# Patient Record
Sex: FEMALE | Race: WHITE
[De-identification: names, ages, dates, MRNs, and addresses within clinical notes are randomized per-mention and may not be internally consistent; named-entity substitution may affect disease eponyms.]

---

## 2019-07-04 ENCOUNTER — HOSPITAL ENCOUNTER (EMERGENCY)
Dept: HOSPITAL 7 - FB.ED | Age: 29
Discharge: HOME | End: 2019-07-04
Payer: MEDICAID

## 2019-07-04 DIAGNOSIS — O21.0: Primary | ICD-10-CM

## 2019-07-04 DIAGNOSIS — O23.42: ICD-10-CM

## 2019-07-04 DIAGNOSIS — Z79.899: ICD-10-CM

## 2019-07-04 DIAGNOSIS — Z3A.18: ICD-10-CM

## 2019-07-04 DIAGNOSIS — F17.210: ICD-10-CM

## 2019-07-04 DIAGNOSIS — O99.332: ICD-10-CM

## 2019-07-04 PROCEDURE — 81001 URINALYSIS AUTO W/SCOPE: CPT

## 2019-07-04 PROCEDURE — 80053 COMPREHEN METABOLIC PANEL: CPT

## 2019-07-04 PROCEDURE — 36415 COLL VENOUS BLD VENIPUNCTURE: CPT

## 2019-07-04 PROCEDURE — 87086 URINE CULTURE/COLONY COUNT: CPT

## 2019-07-04 PROCEDURE — 96375 TX/PRO/DX INJ NEW DRUG ADDON: CPT

## 2019-07-04 PROCEDURE — 96361 HYDRATE IV INFUSION ADD-ON: CPT

## 2019-07-04 PROCEDURE — 85025 COMPLETE CBC W/AUTO DIFF WBC: CPT

## 2019-07-04 PROCEDURE — 83735 ASSAY OF MAGNESIUM: CPT

## 2019-07-04 PROCEDURE — 96365 THER/PROPH/DIAG IV INF INIT: CPT

## 2019-07-04 PROCEDURE — 99284 EMERGENCY DEPT VISIT MOD MDM: CPT

## 2019-07-04 NOTE — EDM.PDOC
ED HPI GENERAL MEDICAL PROBLEM





- General


Chief Complaint: Gastrointestinal Problem


Stated Complaint: VOMITING


Time Seen by Provider: 19 21:10


Source of Information: Reports: Patient


History Limitations: Reports: No Limitations





- History of Present Illness


INITIAL COMMENTS - FREE TEXT/NARRATIVE: 





28-year-old female with history of hyperemesis gravidarum with all of her 

pregnancies. She is a  6 para 5 and is approximately 18 weeks pregnant 

with a due date of 2019 who reports that she recently moved here from 

[place] (week and a half ago) and she ran out of her Zofran. She states she has 

vomiting and nausea every day, however, she ran out of her Zofran yesterday and 

today she has been unable to stop vomiting. She reports she's had vomiting 10-

15. Is been nonbilious. She's had no diarrhea. She has had no dysuria. She 

feels somewhat dizzy and lightheaded. She states that this is happening 

multiple times during this pregnancy and her other pregnancies and "I just need 

some IV fluids and some Zofran and I'll be good to go" she's had no fevers. She 

has had intermittent lower abdominal cramping that she has had quite some time. 

She has no pain now. She rates her pain as a 0/10. She has had no vaginal 

bleeding or vaginal discharge. There are no other associated signs or symptoms. 

There are no other modifying factors.


Onset: Other (Ongoing throughout her pregnancy and all of her other pregnancies 

but seems to be worse today)


Duration: Getting Worse (Worse today, unable to stop vomiting)


Location: Reports: Other (Not applicable)


Quality: Reports: Other (No pain at present, otherwise as above)


Severity: Moderate


Improves with: Reports: Medication (Zofran, the patient tells me.)


Worsens with: Reports: None


Context: Reports: Other (As above)


Associated Symptoms: Reports: Weakness, Other (Lightheadedness)


Treatments PTA: Reports: Other Medication(s) (Took antinausea suppositories 

with no relief)


  ** Abdomen


Pain Score (Numeric/FACES): 4





- Related Data


 Allergies











Allergy/AdvReac Type Severity Reaction Status Date / Time


 


No Known Allergies Allergy   Verified 19 20:02











Home Meds: 


 Home Meds





Cephalexin [Keflex] 500 mg PO TID 7 Days #21 capsule 19 [Rx]


Ondansetron [Zofran ODT] 4 mg PO DAILY 19 [History]


Ondansetron [Zofran ODT] 4 mg PO Q6H PRN #20 tab.dis 19 [Rx]


Pnv No.95/Ferrous Fum/Folic AC [Prenatal Caplet] 1 each PO DAILY 19 [

History]


Pyridoxine HCl (Vitamin B6) [Pyridoxine HCl] 50 mg PO BID #60 tablet 19 [

Rx]











Past Medical History


OB/GYN History: Reports: Hyperemesis, Pregnancy (18 weeks with an EDC of 2019)





- Infectious Disease History


Infectious Disease History: Reports: Hepatitis C





- Past Surgical History


HEENT Surgical History: Reports: Tonsillectomy


GI Surgical History: Reports: Appendectomy, Cholecystectomy





Social & Family History





- Tobacco Use


Smoking Status *Q: Current Every Day Smoker


Years of Tobacco use: 10


Packs/Tins Daily: 0.5


Second Hand Smoke Exposure: No





- Caffeine Use


Caffeine Use: Reports: Soda





- Alcohol Use


Alcohol Use History: No





- Recreational Drug Use


Recreational Drug Use: Yes


Drug Use in Last 12 Months: No





- Living Situation & Occupation


Living situation: Reports: with Significant Other


Social History Comment: Recently moved here from [place]. Has not established 

with an OB yet.





ED ROS GENERAL





- Review of Systems


Review Of Systems: See Below


Constitutional: Reports: No Symptoms


HEENT: Reports: Other (Primary)


Respiratory: Reports: No Symptoms


Cardiovascular: Reports: No Symptoms


GI/Abdominal: Reports: Nausea, Vomiting


: Reports: No Symptoms


Musculoskeletal: Reports: No Symptoms


Neurological: Reports: No Symptoms


Hematologic/Lymphatic: Reports: No Symptoms


Immunologic: Reports: No Symptoms





ED EXAM, GENERAL





- Physical Exam


Exam: See Below


Exam Limited By: No Limitations


General Appearance: Alert, WD/WN, Mild Distress


Eye Exam: Bilateral Eye: EOMI, Normal Inspection, PERRL


Ears: Normal External Exam


Ear Exam: Bilateral Ear: Auricle Normal


Nose: Normal Inspection, Normal Mucosa, No Blood


Throat/Mouth: Normal Voice, No Airway Compromise, Other (Dry mucous membranes)


Head: Atraumatic, Normocephalic


Neck: Normal Inspection, Supple, Non-Tender, Full Range of Motion


Respiratory/Chest: No Respiratory Distress, Lungs Clear, Normal Breath Sounds, 

No Accessory Muscle Use, Chest Non-Tender


Cardiovascular: Normal Peripheral Pulses, Regular Rate, Rhythm, No JVD


Peripheral Pulses: 2+: Radial (L), Radial (R), Dorsalis Pedis (L), Dorsalis 

Pedis (R)


GI/Abdominal: Normal Bowel Sounds, Soft, Non-Tender, No Mass, Other (Gravid; 

fetal heart tones 140)


Back Exam: Normal Inspection, Full Range of Motion


Extremities: Normal Inspection, Normal Range of Motion, Non-Tender, No Pedal 

Edema, Normal Capillary Refill


Neurological: Alert, Oriented, CN II-XII Intact, Normal Cognition, Normal Gait, 

No Motor/Sensory Deficits


Skin Exam: Warm, Dry, Intact, Normal Color, No Rash





Course





- Vital Signs


Last Recorded V/S: 


 Last Vital Signs











Temp  36.8 C   19 20:34


 


Pulse  71   19 20:34


 


Resp  18   19 20:34


 


BP  104/55 L  19 20:34


 


Pulse Ox  99   19 20:34














- Orders/Labs/Meds


Orders: 


 Active Orders 24 hr











 Category Date Time Status


 


 CULTURE URINE [RM] Stat Lab  19 21:18 Received


 


 Sodium Chloride 0.9% [Saline Flush] Med  19 19:39 Active





 10 ml FLUSH ASDIRECTED PRN   


 


 Peripheral IV Insertion Adult [OM.PC] Routine Oth  19 19:39 Ordered








 Medication Orders





Sodium Chloride (Saline Flush)  10 ml FLUSH ASDIRECTED PRN


   PRN Reason: Keep Vein Open








Labs: 


 Laboratory Tests











  19 Range/Units





  19:55 19:55 21:18 


 


WBC  9.2    (4.5-12.0)  X10-3/uL


 


RBC  4.61    (3.23-5.20)  x10(6)uL


 


Hgb  14.0    (11.5-15.5)  g/dL


 


Hct  40.4    (30.0-51.3)  %


 


MCV  87.7    (80-96)  fL


 


MCH  30.3    (27.7-33.6)  pg


 


MCHC  34.6    (32.2-35.4)  g/dL


 


RDW  12.3    (11.5-15.5)  %


 


Plt Count  232    (125-369)  X10(3)uL


 


MPV  8.6    (7.4-10.4)  fL


 


Neut % (Auto)  66.6    (46-82)  %


 


Lymph % (Auto)  22.5    (13-37)  %


 


Mono % (Auto)  2.7 L    (4-12)  %


 


Eos % (Auto)  8 H    (1.0-5.0)  %


 


Baso % (Auto)  1    (0-2)  %


 


Neut # (Auto)  6.2    (1.6-8.3)  #


 


Lymph # (Auto)  2.1    (0.6-5.0)  #


 


Mono # (Auto)  0.2    (0.0-1.3)  #


 


Eos # (Auto)  0.7    (0.0-0.8)  #


 


Baso # (Auto)  0.0    (0.0-0.2)  #


 


Sodium   138   (135-145)  mmol/L


 


Potassium   3.9   (3.5-5.3)  mmol/L


 


Chloride   102   (100-110)  mmol/L


 


Carbon Dioxide   26   (21-32)  mmol/L


 


BUN   9   (7-18)  mg/dL


 


Creatinine   0.6   (0.55-1.02)  mg/dL


 


Est Cr Clr Drug Dosing   TNP   


 


Estimated GFR (MDRD)   > 60   (>60)  


 


BUN/Creatinine Ratio   15.0   (9-20)  


 


Glucose   77 L   ()  mg/dL


 


Calcium   9.4   (8.6-10.2)  mg/dL


 


Magnesium   1.8   (1.8-2.5)  mg/dL


 


Total Bilirubin   0.3   (0.1-1.3)  mg/dL


 


AST   12   (5-25)  IU/L


 


ALT   19   (12-36)  U/L


 


Alkaline Phosphatase   45 L   ()  IU/L


 


Total Protein   7.8   (6.0-8.0)  g/dL


 


Albumin   3.4 L   (3.5-5.2)  g/dL


 


Globulin   4.4   g/dL


 


Albumin/Globulin Ratio   0.8   


 


Urine Color    Yellow  (YELLOW)  


 


Urine Appearance    Clear  (CLEAR)  


 


Urine pH    8.0 H  (5.0-6.5)  


 


Ur Specific Gravity    1.015  (1.010-1.025)  


 


Urine Protein    Negative  (NEGATIVE)  mg/dL


 


Urine Glucose (UA)    Normal  (NORMAL)  mg/dL


 


Urine Ketones    50 H  (NEGATIVE)  mg/dL


 


Urine Occult Blood    Negative  (NEGATIVE)  


 


Urine Nitrite    Negative  (NEGATIVE)  


 


Urine Bilirubin    Negative  (NEGATIVE)  


 


Urine Urobilinogen    Normal  (NEGATIVE)  mg/dL


 


Ur Leukocyte Esterase    Large H  (NEGATIVE)  


 


Urine RBC    0-5  (0-5)  


 


Urine WBC    5-10 H  (0-5)  


 


Ur Squamous Epith Cells    Few H  (NS,R,O)  


 


Urine Bacteria    Few H  (NS)  











Meds: 


Medications











Generic Name Dose Route Start Last Admin





  Trade Name Freq  PRN Reason Stop Dose Admin


 


Sodium Chloride  10 ml  19 19:39  





  Saline Flush  FLUSH   





  ASDIRECTED PRN   





  Keep Vein Open   





     





     





     














Discontinued Medications














Generic Name Dose Route Start Last Admin





  Trade Name Freq  PRN Reason Stop Dose Admin


 


Ceftriaxone Sodium  1 gm  19 21:42  19 22:22





  Rocephin  IVPUSH  19 21:43  1 gm





  ONETIME ONE   Administration





     





     





     





     


 


Promethazine HCl 25 mg/ Sodium  51 mls @ 200 mls/hr  19 19:40  19 20

:22





  Chloride  IV  19 19:55  200 mls/hr





  ONETIME ONE   Administration





     





     





     





     


 


Sodium Chloride  1,000 mls @ 999 mls/hr  19 19:45  19 21:44





  Normal Saline  IV   999 mls/hr





  .BOLUS VEL   Administration





     





     





     





     


 


Ondansetron HCl  4 mg  19 21:42  19 22:22





  Zofran  IVPUSH  19 21:43  4 mg





  ONETIME ONE   Administration





     





     





     





     


 


Pyridoxine HCl  50 mg  19 19:42  19 20:37





  Vitamin B6  IV  19 19:43  50 mg





  ONETIME ONE   Administration





     





     





     





     














- Re-Assessments/Exams


Free Text/Narrative Re-Assessment/Exam: 





19 22:52: Patient has received 2 L of normal saline IV. She has also 

received Phenergan IV and a Zofran IV (she has taken this with every one of her 

pregnancies and has never had a problem with it and she has no problems taking 

this medication while she is pregnant). She has been able to drink liquids and 

although nauseated, has had no further emesis. She still. Lightheaded but she 

does feel much improved from previous and she states she is actually hungry. 

She does have what appears to be urinary tract infection and I have treated her 

with Rocephin 1 g IV and I will place her on Keflex 500 mg 3 times a day. She 

is to establish with an OB. I will give her a prescription for Zofran. The 

patient was also given vitamin B6 IV and I will place her on that daily to see 

if this will help with her hyperemesis.








Departure





- Departure


Time of Disposition: 23:00


Disposition: Home, Self-Care 01


Condition: Good (Improved)


Clinical Impression: 


 Hyperemesis gravidarum, Dehydration





UTI (urinary tract infection)


Qualifiers:


 Urinary tract infection type: site unspecified Hematuria presence: without 

hematuria Qualified Code(s): N39.0 - Urinary tract infection, site not specified








- Discharge Information


Prescriptions: 


Cephalexin [Keflex] 500 mg PO TID 7 Days #21 capsule


Ondansetron [Zofran ODT] 4 mg PO Q6H PRN #20 tab.dis


 PRN Reason: Nausea/Vomiting


Pyridoxine HCl (Vitamin B6) [Pyridoxine HCl] 50 mg PO BID #60 tablet


Instructions:  Urinary Tract Infection, Adult, Easy-to-Read, Hyperemesis 

Gravidarum, Pregnancy and Urinary Tract Infection, Dehydration, Adult


Referrals: 


PCP,None [Primary Care Provider] - 


Forms:  ED Department Discharge


Additional Instructions: 


Your blood tests were reassuring normal. Your urine test showed some evidence 

of infection. Rest. Drink plenty of fluids. Medication as prescribed (Keflex 

500 mg, Zofran 4 mg ODT, pyridoxine/vitamin B6). Establish with an OB in this 

area as soon as she can. Back to the emergency department for unrelenting 

vomiting, vaginal bleeding, worsening abdominal pain, fever or any other 

concerning sign or symptom.





- My Orders


Last 24 Hours: 


My Active Orders





19 19:39


Sodium Chloride 0.9% [Saline Flush]   10 ml FLUSH ASDIRECTED PRN 


Peripheral IV Insertion Adult [OM.PC] Routine 





19 21:18


CULTURE URINE [RM] Stat 














- Assessment/Plan


Last 24 Hours: 


My Active Orders





19 19:39


Sodium Chloride 0.9% [Saline Flush]   10 ml FLUSH ASDIRECTED PRN 


Peripheral IV Insertion Adult [OM.PC] Routine 





19 21:18


CULTURE URINE [RM] Stat